# Patient Record
Sex: MALE | Race: WHITE | NOT HISPANIC OR LATINO | URBAN - METROPOLITAN AREA
[De-identification: names, ages, dates, MRNs, and addresses within clinical notes are randomized per-mention and may not be internally consistent; named-entity substitution may affect disease eponyms.]

---

## 2017-08-23 ENCOUNTER — ALLSCRIPTS OFFICE VISIT (OUTPATIENT)
Dept: OTHER | Facility: OTHER | Age: 22
End: 2017-08-23

## 2017-09-11 ENCOUNTER — ALLSCRIPTS OFFICE VISIT (OUTPATIENT)
Dept: OTHER | Facility: OTHER | Age: 22
End: 2017-09-11

## 2017-09-11 DIAGNOSIS — Z00.00 ENCOUNTER FOR GENERAL ADULT MEDICAL EXAMINATION WITHOUT ABNORMAL FINDINGS: ICD-10-CM

## 2017-09-11 DIAGNOSIS — Z83.42 FAMILY HISTORY OF HYPERCHOLESTEROLEMIA: ICD-10-CM

## 2017-09-12 ENCOUNTER — GENERIC CONVERSION - ENCOUNTER (OUTPATIENT)
Dept: OTHER | Facility: OTHER | Age: 22
End: 2017-09-12

## 2017-09-12 ENCOUNTER — LAB CONVERSION - ENCOUNTER (OUTPATIENT)
Dept: OTHER | Facility: OTHER | Age: 22
End: 2017-09-12

## 2017-09-12 LAB
A/G RATIO (HISTORICAL): 1.6 (CALC) (ref 1–2.5)
ALBUMIN SERPL BCP-MCNC: 4.6 G/DL (ref 3.6–5.1)
ALP SERPL-CCNC: 49 U/L (ref 40–115)
ALT SERPL W P-5'-P-CCNC: 30 U/L (ref 9–46)
AST SERPL W P-5'-P-CCNC: 21 U/L (ref 10–40)
BILIRUB SERPL-MCNC: 0.9 MG/DL (ref 0.2–1.2)
BUN SERPL-MCNC: 13 MG/DL (ref 7–25)
BUN/CREA RATIO (HISTORICAL): NORMAL (CALC) (ref 6–22)
CALCIUM SERPL-MCNC: 9.6 MG/DL (ref 8.6–10.3)
CHLORIDE SERPL-SCNC: 103 MMOL/L (ref 98–110)
CHOLEST SERPL-MCNC: 104 MG/DL
CHOLEST/HDLC SERPL: 4.7 (CALC)
CO2 SERPL-SCNC: 27 MMOL/L (ref 20–31)
CREAT SERPL-MCNC: 0.92 MG/DL (ref 0.6–1.35)
EGFR AFRICAN AMERICAN (HISTORICAL): 137 ML/MIN/1.73M2
EGFR-AMERICAN CALC (HISTORICAL): 118 ML/MIN/1.73M2
GAMMA GLOBULIN (HISTORICAL): 2.9 G/DL (CALC) (ref 1.9–3.7)
GLUCOSE (HISTORICAL): 92 MG/DL (ref 65–99)
HDLC SERPL-MCNC: 22 MG/DL
LDL CHOLESTEROL (HISTORICAL): 66 MG/DL (CALC)
NON-HDL-CHOL (CHOL-HDL) (HISTORICAL): 82 MG/DL (CALC)
POTASSIUM SERPL-SCNC: 3.8 MMOL/L (ref 3.5–5.3)
SODIUM SERPL-SCNC: 140 MMOL/L (ref 135–146)
TOTAL PROTEIN (HISTORICAL): 7.5 G/DL (ref 6.1–8.1)
TRIGL SERPL-MCNC: 82 MG/DL

## 2018-01-11 NOTE — PROGRESS NOTES
Assessment    1  Encounter for preventive health examination (V70 0) (Z00 00)    Plan   Health Maintenance    · Stop: Fluzone Quadrivalent Intramuscular Suspension  Health Maintenance, Family history of hypercholesterolemia    · (1) LIPID PANEL FASTING W DIRECT LDL REFLEX; Status:Active; Requested  for:[id number];     (1) COMPREHENSIVE METABOLIC PANEL; Status:Resulted - Requires Verification;   Done: [id number] 12:00AM  Due:[id number]; Ordered;    For:Health Maintenance; Ordered By:Brad Lorenzo;      Discussion/Summary  Impression: health maintenance visit  Currently, he eats an adequate diet and has an inadequate exercise regimen  Prostate cancer screening: PSA is not indicated  Testicular cancer screening: monthly self testicular exam was advised  Colorectal cancer screening: colorectal cancer screening is not indicated  Screening lab work includes glucose and lipid profile  Advice and education were given regarding nutrition, weight bearing exercise, weight loss, cardiovascular risk reduction and seat belt use  Patient discussion: discussed with the patient  Chief Complaint  Pt here for physical      History of Present Illness  HM, Adult Male: The last health maintenance visit was 3 year(s) ago  General Health: He has regular dental visits  He denies vision problems  He denies hearing loss  Immunizations status: not up to date  Lifestyle:  He consumes a diverse and healthy diet  Diet problems: low in fiber and high in sugar  He has weight concerns  He exercises regularly  He exercises less than three times a week  Exercise includes walking  He does not use tobacco  He denies alcohol use  He denies drug use  Screening: cancer screening reviewed and current  metabolic screening reviewed and current  risk screening reviewed and current  Review of Systems    Constitutional: No fever or chills, feels well, no tiredness, no recent weight gain or weight loss     Eyes: No complaints of eye pain, no red eyes, no discharge from eyes, no itchy eyes  ENT: no complaints of earache, no hearing loss, no nosebleeds, no nasal discharge, no sore throat, no hoarseness  Cardiovascular: No complaints of slow heart rate, no fast heart rate, no chest pain, no palpitations, no leg claudication, no lower extremity  Respiratory: No complaints of shortness of breath, no wheezing, no cough, no SOB on exertion, no orthopnea or PND  Gastrointestinal: No complaints of abdominal pain, no constipation, no nausea or vomiting, no diarrhea or bloody stools  Genitourinary: No complaints of dysuria, no incontinence, no hesitancy, no nocturia, no genital lesion, no testicular pain  Musculoskeletal: No complaints of arthralgia, no myalgias, no joint swelling or stiffness, no limb pain or swelling  Integumentary: No complaints of skin rash or skin lesions, no itching, no skin wound, no dry skin  Neurological: No compliants of headache, no confusion, no convulsions, no numbness or tingling, no dizziness or fainting, no limb weakness, no difficulty walking  Psychiatric: Is not suicidal, no sleep disturbances, no anxiety or depression, no change in personality, no emotional problems  Endocrine: No complaints of proptosis, no hot flashes, no muscle weakness, no erectile dysfunction, no deepening of the voice, no feelings of weakness  Hematologic/Lymphatic: No complaints of swollen glands, no swollen glands in the neck, does not bleed easily, no easy bruising  Active Problems    1   Rhus dermatitis (692 6) (L23 7)    Surgical History    · Denied: History Of Prior Surgery    Family History  Mother    · No pertinent family history    Social History    · Always uses seat belt   · Employed   · Has no children   · Lives in private house   · Lives with spouse   · Never a smoker   · No advance directives (V49 89) (Z78 9)   · No alcohol use   · No history of alcohol use (Z78 9)   · No living will   · Primary language is [place]   · Supportive and safe    Current Meds   1  PredniSONE 10 MG Oral Tablet; TAKE 4 TABLETS DAILY FOR 3 DAYS,3 TABLETS   DAILY FOR 3 DAYS, 2 TABLETS DAILY FOR 3 DAYS AND 1 TABLET DAILY FOR   3 DAYS, THEN STOP; Therapy: 23Aug2017 to (Evaluate:04Sep2017)  Requested for: 23Aug2017; Last   Rx:23Aug2017 Ordered    Allergies    1  No Known Drug Allergies    Vitals   Recorded: 11Sep2017 01:58PM   Temperature 98 7 F   Heart Rate 88   Respiration 16   Systolic 955   Diastolic 84   Height 5 ft 11 in   Weight 260 lb 2 oz   BMI Calculated 36 28   BSA Calculated 2 36     Physical Exam    Constitutional   General appearance: No acute distress, well appearing and well nourished  Eyes   Pupils and irises: Equal, round, reactive to light  Ears, Nose, Mouth, and Throat   Otoscopic examination: Tympanic membranes translucent with normal light reflex  Canals patent without erythema  Oropharynx: Normal with no erythema, edema, exudate or lesions  Neck   Thyroid: Normal, no thyromegaly  Pulmonary   Auscultation of lungs: Clear to auscultation  Cardiovascular   Auscultation of heart: Normal rate and rhythm, normal S1 and S2, no murmurs  Peripheral vascular exam: Normal     Abdomen   Abdomen: Non-tender, no masses  Musculoskeletal   Gait and station: Normal     Range of motion: Normal     Muscle strength/tone: Normal     Skin   Skin and subcutaneous tissue: Normal without rashes or lesions  Neurologic   Cranial nerves: Cranial nerves 2-12 intact      Psychiatric   Orientation to person, place and time: Normal     Mood and affect: Normal        Signatures   Electronically signed by : Leta Kent DO; Sep 24 2017  9:42PM EST                       (Author)

## 2018-01-12 VITALS
SYSTOLIC BLOOD PRESSURE: 134 MMHG | WEIGHT: 260.13 LBS | BODY MASS INDEX: 36.42 KG/M2 | HEART RATE: 88 BPM | HEIGHT: 71 IN | RESPIRATION RATE: 16 BRPM | TEMPERATURE: 98.7 F | DIASTOLIC BLOOD PRESSURE: 84 MMHG

## 2018-01-14 VITALS
RESPIRATION RATE: 18 BRPM | HEART RATE: 76 BPM | WEIGHT: 269.2 LBS | DIASTOLIC BLOOD PRESSURE: 72 MMHG | HEIGHT: 72 IN | BODY MASS INDEX: 36.46 KG/M2 | SYSTOLIC BLOOD PRESSURE: 124 MMHG

## 2018-01-18 NOTE — RESULT NOTES
Verified Results  (1) COMPREHENSIVE METABOLIC PANEL [id number] 90:74XK Arlette Schaeffer   REPORT COMMENT:  FASTING:YES     Test Name Result Flag Reference   GLUCOSE 92 mg/dL  65-99   Fasting reference interval   UREA NITROGEN (BUN) 13 mg/dL  7-25   CREATININE 0 92 mg/dL  0 60-1 35   eGFR NON-AFR  AMERICAN 118 mL/min/1 73m2  > OR = 60   eGFR AFRICAN AMERICAN 137 mL/min/1 73m2  > OR = 60   BUN/CREATININE RATIO   2-47   NOT APPLICABLE (calc)   SODIUM 140 mmol/L  135-146   POTASSIUM 3 8 mmol/L  3 5-5 3   CHLORIDE 103 mmol/L     CARBON DIOXIDE 27 mmol/L  20-31   CALCIUM 9 6 mg/dL  8 6-10 3   PROTEIN, TOTAL 7 5 g/dL  6 1-8 1   ALBUMIN 4 6 g/dL  3 6-5 1   GLOBULIN 2 9 g/dL (calc)  1 9-3 7   ALBUMIN/GLOBULIN RATIO 1 6 (calc)  1 0-2 5   BILIRUBIN, TOTAL 0 9 mg/dL  0 2-1 2   ALKALINE PHOSPHATASE 49 U/L     AST 21 U/L  10-40   ALT 30 U/L  9-46     (Q) LIPID PANEL WITH REFLEX TO DIRECT LDL [id number] 03:12PM Arlette Schaeffer     Test Name Result Flag Reference   CHOLESTEROL, TOTAL 104 mg/dL  <200   HDL CHOLESTEROL 22 mg/dL L >01   TRIGLICERIDES 82 mg/dL  <702   LDL-CHOLESTEROL 66 mg/dL (calc)     Reference range: <100     Desirable range <100 mg/dL for patients with CHD or  diabetes and <70 mg/dL for diabetic patients with  known heart disease  The David-Cannon calculation is a validated novel   method that provides better accuracy than the   Friedewald equation in the estimation of LDL-C,   particularly when TG levels are 150-400 mg/dL and   LDL-C levels are lower than 70 mg/dL  Reference:  Tati Roy et al  Comparison of a Novel   Method vs the Friedewald Equation for Estimating   Low-Density Lipoprotein Cholesterol Levels From the   Standard Lipid Profile  J CARLOS  9178;045(33): 7893-8663  For additional information, please refer to  [email or web address]/ZXX185  (This link is being provided for informational/  educational purposes only )   CHOL/HDLC RATIO 4 7 (calc)  <5 0   NON HDL CHOLESTEROL 82 mg/dL (calc)  <130   For patients with diabetes plus 1 major ASCVD risk   factor, treating to a non-HDL-C goal of <100 mg/dL   (LDL-C of <70 mg/dL) is considered a therapeutic   option